# Patient Record
(demographics unavailable — no encounter records)

---

## 2025-02-25 NOTE — PHYSICAL EXAM
[Chaperone Present] : A chaperone was present in the examining room during all aspects of the physical examination [Oriented x3] : oriented x3 [Examination Of The Breasts] : a normal appearance [] : implants [No Masses] : no breast masses were palpable [Labia Majora] : normal [Labia Minora] : normal [Normal] : normal [Absent] : absent [Uterine Adnexae] : normal [FreeTextEntry2] :  Appropriately responsive, alert, and no acute distress. [FreeTextEntry3] :  Thyroid is non-enlarged, nontender. No palpable nodules or goiter. No lymphadenopathy. [FreeTextEntry7] :  Status post abdominoplasty.  Soft. Nondistended. Nontender. No rebound or guarding. There are no palpable masses. [Vulvar Atrophy] : vulvar atrophy [Atrophy] : atrophy [FreeTextEntry1] :  External genitalia are within normal limits with no lesions visualized. [FreeTextEntry4] :  No vaginal discharge, blood or any lesions noted within the vaginal vault. [FreeTextEntry5] :  A speculum was inserted without any difficulty. The cervix is absent status post total hysterectomy. [FreeTextEntry6] : Uterus is absent status post total hysterectomy. There were no palpable adnexal masses or adnexal tenderness. [FreeTextEntry9] :  No lesions. No palpable masses.

## 2025-02-25 NOTE — HISTORY OF PRESENT ILLNESS
[Patient reported mammogram was normal] : Patient reported mammogram was normal [Patient reported breast sonogram was normal] : Patient reported breast sonogram was normal [Patient reported bone density results were normal] : Patient reported bone density results were normal [Patient reported colonoscopy was normal] : Patient reported colonoscopy was normal [LMP unknown] : LMP unknown [postmenopausal] : postmenopausal [Y] : Positive pregnancy history [unknown] : Patient is unsure of the date of her LMP [Menopause Age: ____] : age at menopause was [unfilled] [Currently Active] : currently active [Men] : men [TextBox_4] : 58 -year old  3 para 3 postmenopausal presents for an annual examination. Review of systems are negative. [Mammogramdate] : 03/2024 [BreastSonogramDate] : 03/2024 [BoneDensityDate] : 04/2024 [ColonoscopyDate] : 2019 [PGHxTotal] : 3 [[facility] : [facility] [Banneriving] : 3

## 2025-02-25 NOTE — PLAN
[FreeTextEntry1] : Wellness exam. Normal physical examination. Recommendations: Mammography, bilateral breast ultrasound, ophthalmological, dental, and dermatological examinations.   Status post colonoscopy in 2019; WNL.  Follow-up in 2029. Status post bone density screening April 2024; WNL.  Renew estradiol  Discussed proper nutrition and physical exercise. Reviewed age-appropriate vaccinations.